# Patient Record
Sex: FEMALE | Race: OTHER | Employment: UNEMPLOYED | URBAN - METROPOLITAN AREA
[De-identification: names, ages, dates, MRNs, and addresses within clinical notes are randomized per-mention and may not be internally consistent; named-entity substitution may affect disease eponyms.]

---

## 2024-05-23 ENCOUNTER — HOSPITAL ENCOUNTER (OUTPATIENT)
Age: 34
Discharge: HOME | End: 2024-05-23

## 2024-05-23 VITALS
RESPIRATION RATE: 18 BRPM | SYSTOLIC BLOOD PRESSURE: 123 MMHG | DIASTOLIC BLOOD PRESSURE: 77 MMHG | OXYGEN SATURATION: 97 % | TEMPERATURE: 97 F | HEART RATE: 97 BPM

## 2024-05-23 DIAGNOSIS — J01.90 ACUTE RHINOSINUSITIS: Primary | ICD-10-CM

## 2024-05-23 DIAGNOSIS — H10.9 CONJUNCTIVITIS OF LEFT EYE, UNSPECIFIED CONJUNCTIVITIS TYPE: ICD-10-CM

## 2024-05-23 PROCEDURE — 99213 OFFICE O/P EST LOW 20 MIN: CPT | Performed by: NURSE PRACTITIONER

## 2024-05-23 RX ORDER — LISINOPRIL 5 MG/1
5 TABLET ORAL DAILY
COMMUNITY
Start: 2024-05-20 | End: 2024-06-19

## 2024-05-23 RX ORDER — DAPAGLIFLOZIN 10 MG/1
10 TABLET, FILM COATED ORAL DAILY
COMMUNITY
Start: 2024-05-20 | End: 2024-08-18

## 2024-05-23 RX ORDER — ATORVASTATIN CALCIUM 20 MG/1
20 TABLET, FILM COATED ORAL DAILY
COMMUNITY
Start: 2024-04-14

## 2024-05-23 RX ORDER — POLYMYXIN B SULFATE AND TRIMETHOPRIM 1; 10000 MG/ML; [USP'U]/ML
1 SOLUTION OPHTHALMIC 4 TIMES DAILY
Qty: 1 EACH | Refills: 0 | Status: SHIPPED | OUTPATIENT
Start: 2024-05-23 | End: 2024-05-30

## 2024-05-23 RX ORDER — TETRACAINE HYDROCHLORIDE 5 MG/ML
1 SOLUTION OPHTHALMIC ONCE
Status: COMPLETED | OUTPATIENT
Start: 2024-05-23 | End: 2024-05-23

## 2024-05-23 RX ORDER — AMOXICILLIN AND CLAVULANATE POTASSIUM 875; 125 MG/1; MG/1
1 TABLET, FILM COATED ORAL 2 TIMES DAILY
Qty: 14 TABLET | Refills: 0 | Status: SHIPPED | OUTPATIENT
Start: 2024-05-23 | End: 2024-05-30

## 2024-05-23 NOTE — ED PROVIDER NOTES
Chief Complaint   Patient presents with    Eye Problem       HPI:     Marisel Manley is a 33 year old female with DMII and hyperlipidemia who presents today with a chief complaint of and crusty discharge ongoing since yesterday.  Has comfort in the eye, pain to the inner canthus.  No painful eye movements.  No vision changes.  Does not wear contact lenses.  Does wear glasses.  Has a history of a cataract on the left eye.  Reports being sick, has nasal congestion and sore throat.  Though symptoms have been ongoing for about a week.  PFSH      PFSH asessment screens reviewed and agree.  Nurses notes reviewed I agree with documentation.    No family history on file.  Family history reviewed with patient/caregiver and is not pertinent to presenting problem.  Social History     Socioeconomic History    Marital status: Single     Spouse name: Not on file    Number of children: Not on file    Years of education: Not on file    Highest education level: Not on file   Occupational History    Not on file   Tobacco Use    Smoking status: Not on file    Smokeless tobacco: Not on file   Substance and Sexual Activity    Alcohol use: Not on file    Drug use: Not on file    Sexual activity: Not on file   Other Topics Concern    Not on file   Social History Narrative    Not on file     Social Determinants of Health     Financial Resource Strain: Not on File (10/5/2022)    Received from [facility]     Financial Resource Strain     Financial Resource Strain: 0   Food Insecurity: Not at Risk (5/20/2024)    Received from [facility]     Food Insecurity     Food: 1   Transportation Needs: Not at Risk (5/20/2024)    Received from [facility]     Transportation Needs     Transportation: 1   Physical Activity: Not on File (10/5/2022)    Received from [facility]     Physical Activity     Physical Activity: 0   Stress: Not on File (10/5/2022)    Received from [facility]     Stress     Stress: 0   Social Connections: Not on File (10/5/2022)    Received from [facility]      Social Connections     Social Connections and Isolation: 0   Housing Stability: Not at Risk (2024)    Received from [facility]     Housing Stability     Housin         ROS:   Positive for stated complaint: eye problem  All other systems reviewed and negative except as noted above.  Constitutional and Vital Signs Reviewed.      Physical Exam:     Physical Exam:  /77   Pulse 97   Temp 97 °F (36.1 °C) (Temporal)   Resp 18   LMP 2024 (Approximate)   SpO2 97%   GENERAL: well developed, well nourished, well hydrated, no distress  EYES: sclera non icteric bilateral,  REILLY, EOMI, Conjunctiva inflamed: Yes, left   HEENT: atraumatic, normocephalic, ears, nose and throat are clear  NECK: supple, no adenopathy  LUNGS: clear to auscultation, no RRW  CARDIO: RRR without murmur  EXTREMITIES: no cyanosis or edema. PÉREZ without difficulty.  SKIN: good skin turgor, no obvious rashes    Slit Lamp:      Flourescein Uptake- none on left    MDM/Assessment/Plan:   Orders for this encounter:    Orders Placed This Encounter    tetracaine (Pontocaine) 0.5 % ophthalmic solution 1 drop    fluorescein sodium (Ful-Felisa) 1 MG ophthalmic strip 1 strip    amoxicillin clavulanate 875-125 MG Oral Tab     Sig: Take 1 tablet by mouth 2 (two) times daily for 7 days.     Dispense:  14 tablet     Refill:  0    polymyxin B-trimethoprim 33908-7.1 UNIT/ML-% Ophthalmic Solution     Sig: Place 1 drop into the left eye in the morning, at noon, in the evening, and at bedtime for 7 days.     Dispense:  1 each     Refill:  0       Labs performed this visit:  No results found for this or any previous visit (from the past 10 hour(s)).    MDM:  Medical Decision Making  Differentials considered: Acute rhinosinusitis with conjunctivitis on the left versus corneal abrasion versus corneal ulcer versus iritis versus other     HPI and exam most consistent with acute rhinosinusitis with conjunctivitis on the left.  Will start Augmentin and Polytrim.  Fluorescein exam reveals no uptake consistent with an abrasion or ulcer.  Patient has no photophobia or watery discharge, or significant eye pain, with REILLY bilaterally, unlikely iritis.  She was advised to follow-up closely with ophthalmology.  Phone number was provided.  ER precautions were discussed.  Patient verbalized understanding and agreeable to plan of care.        Risk  OTC drugs.  Prescription drug management.          Diagnosis:    ICD-10-CM    1. Acute rhinosinusitis  J01.90       2. Conjunctivitis of left eye, unspecified conjunctivitis type  H10.9           All results reviewed and discussed with patient.  See AVS for detailed discharge instructions for your condition today.    Follow Up with:  Francisco Javier Gracia MD  [address] [place] [address] [id number] [phone]

## 2024-05-23 NOTE — DISCHARGE INSTRUCTIONS
Augmentin 1 tablet twice per day for 7 days with food  Polytrim eye drops 1 drop four times per day for 7 day  Flonase nasal spray, 2 sprays in each nostril daily for 2 weeks  Jerzy Sinus rinse, available over the counter, do this 2-3 times per day  Push fluids  Steam showers  If you develop facial swelling, chest pain, shortness of breath or any new/worsening symptoms, return or go to the emergency room  If no improvement in 1 week, please see your primary care provider   Please follow up with Dr. Gracia as discussed

## 2025-06-13 ENCOUNTER — HOSPITAL ENCOUNTER (OUTPATIENT)
Age: 35
Discharge: HOME | End: 2025-06-13
Payer: MEDICAID

## 2025-06-13 VITALS
BODY MASS INDEX: 35.44 KG/M2 | OXYGEN SATURATION: 98 % | RESPIRATION RATE: 18 BRPM | HEART RATE: 99 BPM | HEIGHT: 63 IN | SYSTOLIC BLOOD PRESSURE: 124 MMHG | TEMPERATURE: 98 F | WEIGHT: 200 LBS | DIASTOLIC BLOOD PRESSURE: 86 MMHG

## 2025-06-13 DIAGNOSIS — R31.9 URINARY TRACT INFECTION WITH HEMATURIA, SITE UNSPECIFIED: Primary | ICD-10-CM

## 2025-06-13 DIAGNOSIS — N39.0 URINARY TRACT INFECTION WITH HEMATURIA, SITE UNSPECIFIED: Primary | ICD-10-CM

## 2025-06-13 LAB
#MXD IC: 0.4 X10ˆ3/UL (ref 0.1–1)
B-HCG UR QL: NEGATIVE
BUN BLD-MCNC: 16 MG/DL (ref 7–18)
CHLORIDE BLD-SCNC: 102 MMOL/L (ref 98–112)
CLARITY UR: CLEAR
CO2 BLD-SCNC: 22 MMOL/L (ref 21–32)
COLOR UR: YELLOW
CREAT BLD-MCNC: <0.35 MG/DL (ref 0.55–1.02)
GLUCOSE BLD-MCNC: 152 MG/DL (ref 70–99)
GLUCOSE UR STRIP-MCNC: 500 MG/DL
HCT VFR BLD AUTO: 41.6 % (ref 35–48)
HCT VFR BLD CALC: 45 % (ref 34–50)
HGB BLD-MCNC: 13.8 G/DL (ref 12–16)
ISTAT IONIZED CALCIUM FOR CHEM 8: 1.18 MMOL/L (ref 1.12–1.32)
KETONES UR STRIP-MCNC: >=160 MG/DL
LEUKOCYTE ESTERASE UR QL STRIP: NEGATIVE
LYMPHOCYTES # BLD AUTO: 2.3 X10ˆ3/UL (ref 1–4)
LYMPHOCYTES NFR BLD AUTO: 26.1 %
MCH RBC QN AUTO: 27.8 PG (ref 26–34)
MCHC RBC AUTO-ENTMCNC: 33.2 G/DL (ref 31–37)
MCV RBC AUTO: 83.9 FL (ref 80–100)
MIXED CELL %: 5 %
NEUTROPHILS # BLD AUTO: 6 X10ˆ3/UL (ref 1.5–7.7)
NEUTROPHILS NFR BLD AUTO: 68.9 %
NITRITE UR QL STRIP: NEGATIVE
PH UR STRIP: 5.5 [PH]
PLATELET # BLD AUTO: 321 X10ˆ3/UL (ref 150–450)
POTASSIUM BLD-SCNC: 4.3 MMOL/L (ref 3.6–5.1)
RBC # BLD AUTO: 4.96 X10ˆ6/UL (ref 3.8–5.3)
SODIUM BLD-SCNC: 136 MMOL/L (ref 136–145)
SP GR UR STRIP: 1.02
UROBILINOGEN UR STRIP-ACNC: <2 MG/DL
WBC # BLD AUTO: 8.7 X10ˆ3/UL (ref 4–11)

## 2025-06-13 PROCEDURE — 81002 URINALYSIS NONAUTO W/O SCOPE: CPT | Performed by: PHYSICIAN ASSISTANT

## 2025-06-13 PROCEDURE — 80047 BASIC METABLC PNL IONIZED CA: CPT | Performed by: PHYSICIAN ASSISTANT

## 2025-06-13 PROCEDURE — 99214 OFFICE O/P EST MOD 30 MIN: CPT | Performed by: PHYSICIAN ASSISTANT

## 2025-06-13 PROCEDURE — 85025 COMPLETE CBC W/AUTO DIFF WBC: CPT | Performed by: PHYSICIAN ASSISTANT

## 2025-06-13 PROCEDURE — 81025 URINE PREGNANCY TEST: CPT | Performed by: PHYSICIAN ASSISTANT

## 2025-06-13 PROCEDURE — S0119 ONDANSETRON 4 MG: HCPCS | Performed by: PHYSICIAN ASSISTANT

## 2025-06-13 RX ORDER — ASPIRIN 81 MG/1
TABLET, CHEWABLE ORAL
COMMUNITY

## 2025-06-13 RX ORDER — DAPAGLIFLOZIN 10 MG/1
10 TABLET, FILM COATED ORAL DAILY
COMMUNITY
Start: 2024-05-20

## 2025-06-13 RX ORDER — ONDANSETRON 4 MG/1
4 TABLET, ORALLY DISINTEGRATING ORAL ONCE
Status: COMPLETED | OUTPATIENT
Start: 2025-06-13 | End: 2025-06-13

## 2025-06-13 RX ORDER — PROPRANOLOL HYDROCHLORIDE 10 MG/1
10 TABLET ORAL DAILY
COMMUNITY
Start: 2025-05-07

## 2025-06-13 RX ORDER — FLUTICASONE PROPIONATE 44 UG/1
1 AEROSOL, METERED RESPIRATORY (INHALATION) 2 TIMES DAILY
COMMUNITY
Start: 2025-02-18

## 2025-06-13 RX ORDER — SULFAMETHOXAZOLE AND TRIMETHOPRIM 800; 160 MG/1; MG/1
1 TABLET ORAL 2 TIMES DAILY
Qty: 14 TABLET | Refills: 0 | Status: SHIPPED | OUTPATIENT
Start: 2025-06-13 | End: 2025-06-18 | Stop reason: ALTCHOICE

## 2025-06-13 RX ORDER — LISINOPRIL 10 MG/1
10 TABLET ORAL DAILY
COMMUNITY
Start: 2024-05-29

## 2025-06-13 RX ORDER — DULAGLUTIDE 0.75 MG/.5ML
0.75 INJECTION, SOLUTION SUBCUTANEOUS
COMMUNITY
Start: 2024-05-20

## 2025-06-13 NOTE — ED PROVIDER NOTES
Patient Seen in: [facility]        History  Chief Complaint   Patient presents with    Urinary Symptoms    Nausea     Stated Complaint: vomiting; urinary symptoms    Subjective:   HPI            Patient is a 34-year-old female that presents to immediate care due to dysuria x 2 days.  Patient states over the past few weeks she has had urinary frequency and urgency.  Patient states she believed it was due to her diabetes.  Patient states that she is compliant with her diabetic medication and denies history of DKA or HHS.  Patient states this morning she began to feel nauseous and had 1 episode of vomiting.  Currently denying abdominal pain flank pain fever hematuria.      Objective:     No pertinent past medical history.            No pertinent past surgical history.              No pertinent social history.            Review of Systems    Positive for stated complaint: vomiting; urinary symptoms  Other systems are as noted in HPI.  Constitutional and vital signs reviewed.      All other systems reviewed and negative except as noted above.                  Physical Exam    ED Triage Vitals [06/13/25 0938]   /86   Pulse 108   Resp 18   Temp 98.4 °F (36.9 °C)   Temp src Oral   SpO2 98 %   O2 Device None (Room air)       Current Vitals:   Vital Signs  BP: 124/86  Pulse: 99  Resp: 18  Temp: 98.4 °F (36.9 °C)  Temp src: Oral    Oxygen Therapy  SpO2: 98 %  O2 Device: None (Room air)            Physical Exam  Vital signs reviewed. Nursing note reviewed.  Constitutional: Well-developed. Well-nourished. In no acute distress  HENT: Mucous membranes moist.   EYES: No scleral icterus or conjunctival injection.  NECK: Full ROM. Supple.   CARDIAC: Normal rate. Normal S1/ S2. 2+ distal pulses. No edema  PULM/CHEST: Clear to auscultation bilaterally. No wheezes  ABD: Soft, non-tender, non-distended.   : No CVA tenderness.  RECTAL: deferred  Extremities: Full ROM  NEURO: Awake, alert, following commands, moving  extremities, answering questions.   SKIN: Warm and dry. No rash or lesions.  PSYCH: Normal judgment. Normal affect.            ED Course  Labs Reviewed   [facility] POCT URINALYSIS DIPSTICK - Abnormal; Notable for the following components:       Result Value    Protein urine Trace (*)     Glucose, Urine 500 (*)     Ketone, Urine >=160 (*)     Bilirubin, Urine Small (*)     Blood, Urine Small (*)     All other components within normal limits   POCT ISTAT CHEM8 CARTRIDGE - Abnormal; Notable for the following components:    ISTAT Glucose 152 (*)     ISTAT Creatinine <0.35 (*)     All other components within normal limits   POCT PREGNANCY URINE - Normal   POCT CBC   URINE CULTURE, ROUTINE                            MDM     Patient is a 34-year-old female who presents to immediate care due to dysuria worsening over the past 2 days with urinary frequency and urgency over the past few weeks.  Patient arrives with stable vitals, sitting comfortably.  Physical exam showing soft abdomen to palpation and no CVA tenderness.  Due to urinary frequency with episode of nausea vomiting we will obtain basic labs including CBC BMP to evaluate for DKA, HHS as urine dipstick positive for glucose, ketones.  Most likely UTI.  Less likely nephrolithiasis, pyelonephritis given no abdominal tenderness, flank pain or CVA tenderness on physical exam.  Urine pregnancy negative.  History given by patient.      10:34 AM  Lab work reassuring, no leukocytosis, no electrolyte derangement.  Patient has mild hyperglycemia, glucose 150 most likely due to patient eating prior to arrival.  Patient does not appear to be in DKA or HHS at this time.  Urinalysis positive for protein, blood due to dysuria, urinary frequency urgency will treat for UTI  With Bactrim.  Urine culture sent for susceptibility.  Urinalysis positive for glucose most likely secondary to dapagliflozin diabetic medication.  ED precautions discussed including abdominal pain fever worsening  nausea vomiting, worsening symptoms.  Patient agreeable to plan all questions answered.        Medical Decision Making      Disposition and Plan     Clinical Impression:  1. Urinary tract infection with hematuria, site unspecified         Disposition:  Discharge  6/13/2025 10:22 am    Follow-up:  Bobbi Singletary, RASTA  [address] [facility] [address]  [phone]    Schedule an appointment as soon as possible for a visit             Medications Prescribed:  Discharge Medication List as of 6/13/2025 10:22 AM                Supplementary Documentation:

## 2025-06-13 NOTE — ED INITIAL ASSESSMENT (HPI)
Pt reports urinary symptoms \"for weeks\". States she had bladder pain that started last night. Reports an episode of emesis this morning but has tolerated PO fluids, banana and yogurt. Denies fevers.